# Patient Record
Sex: MALE | Race: WHITE | URBAN - METROPOLITAN AREA
[De-identification: names, ages, dates, MRNs, and addresses within clinical notes are randomized per-mention and may not be internally consistent; named-entity substitution may affect disease eponyms.]

---

## 2018-02-08 ENCOUNTER — THERAPY VISIT (OUTPATIENT)
Dept: PHYSICAL THERAPY | Facility: CLINIC | Age: 63
End: 2018-02-08
Payer: COMMERCIAL

## 2018-02-08 DIAGNOSIS — M54.41 ACUTE RIGHT-SIDED LOW BACK PAIN WITH RIGHT-SIDED SCIATICA: Primary | ICD-10-CM

## 2018-02-08 PROCEDURE — 97112 NEUROMUSCULAR REEDUCATION: CPT | Mod: GP | Performed by: PHYSICAL THERAPIST

## 2018-02-08 PROCEDURE — 97110 THERAPEUTIC EXERCISES: CPT | Mod: GP | Performed by: PHYSICAL THERAPIST

## 2018-02-08 PROCEDURE — 97161 PT EVAL LOW COMPLEX 20 MIN: CPT | Mod: GP | Performed by: PHYSICAL THERAPIST

## 2018-02-08 NOTE — LETTER
[facility] ATHLETIC MEDICINE  [facility]  [address]   [place] [address]  [phone]    2018    Re: Jhonatan Scales   :   1955  MRN:  [id number]   REFERRING PHYSICIAN:   Rocio Cervantes    [facility] ATHLETIC [facility] [facility]    Date of Initial Evaluation:  2018  Visits:  Rxs Used: 1 - pt arrived late 10'  Reason for Referral:  Acute right-sided low back pain with right-sided sciatica    EVALUATION SUMMARY    [facility] Initial Evaluation -- Lumbar    Date: 2018  Jhonatan Scales is a 62 year old male with a Lumbar condition.   Referral: GP  Work mechanical stresses:  Drafting/Design   Employment status:  normal  Leisure mechanical stresses: not a regular exerciser  Functional disability score (TESSIE/STarT Back):  See flowsheet  VAS score (0-10): 2/10, ranges 0-8/10  Patient goals/expectations:  Get rid of the pain    HISTORY:  Present symptoms: Rt LB and hip into post lat thigh  Pain quality (sharp/shooting/stabbing/aching/burning/cramping):  sharp   Paresthesia (yes/no):  Rt leg is numb  Present since (onset date): 2018 -woke sore after shoveling snow then moved furniture for his mother on Saturday 2-3-2018 and woke in pain  morning.  Painful since .     Symptoms (improving/unchanging/worsening):  Not changing.   Symptoms commenced as a result of: see above   Condition occurred in the following environment:   home   Symptoms at onset (back/thigh/leg): Rt hip pain > LB  Constant symptoms (back/thigh/leg): none  Intermittent symptoms (back/thigh/leg): Rt LB, hip and post lat thigh  Symptoms are made worse with the following: Always Bending, Always Rising, Time of day - Always AM and Other - dressing   Symptoms are made better with the following: Always Walking  Disturbed sleep (yes/no):  no Sleeping postures (prone/sup/side R/L): Side Lt> Rt currently  Previous episodes (0/1-5/6-10/11+): 10+ Year of  first episode: 10-15 yrs ago  Previous history: 1-2 x/yr back pain then resolves on its own  Previous treatments: PT for Lt thigh tingling      Re: Jhonatan Scales   :   1955      Specific Questions:  Cough/Sneeze/Strain (pos/neg): neg  Bowel/Bladder (normal/abnormal): normal  Gait (normal/abnormal): normal  Medications (nil/NSAIDS/analg/steroids/anticoag/other):  NSAIDS, Narcotics/Opiods and Steroids  Medical allergies:  Nka  General health (excellent/good/fair/poor):  good  Pertinent medical history:  None  Imaging (None/Xray/MRI/Other):  none  Recent or major surgery (yes/no):  none  Night pain (yes/no): no  Accidents (yes/no): no  Unexplained weight loss (yes/no): none  Barriers at home: none  Other red flags: none    EXAMINATION    Posture:   Sitting (good/fair/poor): fair - sitting upright but Lordosis is flattened  Standing (good/fair/poor):good  Lordosis (red/acc/normal): normal  Correction of posture (better/worse/no effect): worse - incr hip and LBP, but does have less pain w/ rising from sitting after using L-roll  Lateral Shift (right/left/nil): nil  Relevant (yes/no):    Other Observations: is kyphotic initially w/ stdg until he can get straightened up  Neurological:  Motor deficit:  decr Ant Tib Rt  Reflexes:  na  Sensory deficit:  na  Dural signs:  (+) Slump Rt    Movement Loss:   Waldo Mod Min Nil Pain   Flexion    X ERP Rt hip   Extension   X     Side Gliding R   X  ERP Rt HIp   Side Gliding L   X       Test Movements:   During: produces, abolishes, increases, decreases, no effect, centralizing, peripheralizing   After: better, worse, no better, no worse, no effect, centralized, peripheralized            Re: Jhonatan Scales   :   1955    Pretest symptoms standing: Rt Hip/LB   Symptoms During Symptoms After ROM increased ROM decreased No Effect   FIS Produces    No Worse         Rep FIS Produces    Worse     X    EIS Decreases    No Better         Rep EIS Decreases    No Better      X    Pretest symptoms lying: no pain pronelying    Symptoms During Symptoms After ROM increased ROM decreased No Effect   MICHELLE        Rep MICHELLE        EIL      W/ exhale No Effect   Produce Rt LB No Effect   NW      Rep EIL             W/ exhale Produces  Slight Rt LB   Produce Rt LB No Worse     Better     X - Less pain w/ ROM  X     Static Tests:  Sitting slouched:  No pain  Sitting erect:  Rt LB and hip pain  Standing slouched na  Standing erect:  na  Lying prone in extension:  na Long sitting:  na    Other Tests:     Provisional Classification:  Derangement - Asymmetrical, unilateral, symptoms below knee    Principle of Management:  Education:  Posture:  Reviewed neutral spine, using L-support (L-roll too much currently), affect of posture on spine and pain producing structures, rising after sitting w/ lordosis decreases pain, avoid couch and recliner, no propping up in bed.     Equipment provided:  none  Mechanical therapy (Y/N):  y   Extension principle:  Press up w/ exhale 10x every 2-3 hours and EIStdg (hands on hips at work)  Lateral Principle:    Flexion principle:    Other:      ASSESSMENT/PLAN:  Patient is a 62 year old male with lumbar complaints.    Patient has the following significant findings with corresponding treatment plan.                Diagnosis 1:  Rt LBP w/ Rt sciatica  Pain -  manual therapy, education, directional preference exercise and home program  Decreased ROM/flexibility - manual therapy, therapeutic exercise and home program  Re: Jhonatan Scales   :   1955      Decreased strength - therapeutic exercise, therapeutic activities and home program  Decreased function - therapeutic activities and home program  Impaired posture - neuro re-education and home program    Therapy Evaluation Codes:   1) History comprised of:   Personal factors that impact the plan of care:      None.    Comorbidity factors that impact the plan of care are:      None.     Medications impacting care:  Anti-inflammatory, Pain and Steroids.  2) Examination of Body Systems comprised of:   Body structures and functions that impact the plan of care:      Lumbar spine.   Activity limitations that impact the plan of care are:      Bending, Dressing and rising from sitting.  3) Clinical presentation characteristics are:   Stable/Uncomplicated.  4) Decision-Making    Low complexity using standardized patient assessment instrument and/or measureable assessment of functional outcome.  Cumulative Therapy Evaluation is: Low complexity.  Previous and current functional limitations:  (See Goal Flow Sheet for this information)    Short term and Long term goals: (See Goal Flow Sheet for this information)   Communication ability:  Patient appears to be able to clearly communicate and understand verbal and written communication and follow directions correctly.  Treatment Explanation - The following has been discussed with the patient:   RX ordered/plan of care  Anticipated outcomes  Possible risks and side effects  This patient would benefit from PT intervention to resume normal activities.   Rehab potential is good.  Frequency:  1 X week, once daily  Duration:  for 6 weeks  Discharge Plan:  Achieve all LTG.  Independent in home treatment program.  Reach maximal therapeutic benefit.    Thank you for your referral.      INQUIRIES  Therapist: Mamie Pierce, PT  [facility] [place] [facility]  [address] [facility] [address]  [place] [address]  Phone: [phone]  Fax: [phone]

## 2018-02-08 NOTE — PROGRESS NOTES
[facility] Initial Evaluation -- Lumbar    Date: February 8, 2018  Jhonatan Scales is a 62 year old male with a Lumbar condition.   Referral: GP  Work mechanical stresses:  Drafting/Design   Employment status:  normal  Leisure mechanical stresses: not a regular exerciser  Functional disability score (TESSIE/STarT Back):  See flowsheet  VAS score (0-10): 2/10, ranges 0-8/10  Patient goals/expectations:  Get rid of the pain    HISTORY:    Present symptoms: Rt LB and hip into post lat thigh  Pain quality (sharp/shooting/stabbing/aching/burning/cramping):  sharp   Paresthesia (yes/no):  Rt leg is numb    Present since (onset date): 1- -woke sore after shoveling snow then moved furniture for his mother on Saturday 2-3-2018 and woke in pain Jacques morning.  Painful since .     Symptoms (improving/unchanging/worsening):  Not changing.     Symptoms commenced as a result of: see above   Condition occurred in the following environment:   home     Symptoms at onset (back/thigh/leg): Rt hip pain > LB  Constant symptoms (back/thigh/leg): none  Intermittent symptoms (back/thigh/leg): Rt LB, hip and post lat thigh    Symptoms are made worse with the following: Always Bending, Always Rising, Time of day - Always AM and Other - dressing   Symptoms are made better with the following: Always Walking    Disturbed sleep (yes/no):  no Sleeping postures (prone/sup/side R/L): Side Lt> Rt currently    Previous episodes (0/1-5/6-10/11+): 10+ Year of first episode: 10-15 yrs ago    Previous history: 1-2 x/yr back pain then resolves on its own  Previous treatments: PT for Lt thigh tingling      Specific Questions:  Cough/Sneeze/Strain (pos/neg): neg  Bowel/Bladder (normal/abnormal): normal  Gait (normal/abnormal): normal  Medications (nil/NSAIDS/analg/steroids/anticoag/other):  NSAIDS, Narcotics/Opiods and Steroids  Medical allergies:  Nka  General health (excellent/good/fair/poor):  good  Pertinent medical history:   None  Imaging (None/Xray/MRI/Other):  none  Recent or major surgery (yes/no):  none  Night pain (yes/no): no  Accidents (yes/no): no  Unexplained weight loss (yes/no): none  Barriers at home: none  Other red flags: none    EXAMINATION    Posture:   Sitting (good/fair/poor): fair - sitting upright but Lordosis is flattened  Standing (good/fair/poor):good  Lordosis (red/acc/normal): normal  Correction of posture (better/worse/no effect): worse - incr hip and LBP, but does have less pain w/ rising from sitting after using L-roll    Lateral Shift (right/left/nil): nil  Relevant (yes/no):    Other Observations: is kyphotic initially w/ stdg until he can get straightened up    Neurological:    Motor deficit:  decr Ant Tib Rt  Reflexes:  na  Sensory deficit:  na  Dural signs:  (+) Slump Rt    Movement Loss:   Waldo Mod Min Nil Pain   Flexion    X ERP Rt hip   Extension   X     Side Gliding R   X  ERP Rt HIp   Side Gliding L   X       Test Movements:   During: produces, abolishes, increases, decreases, no effect, centralizing, peripheralizing   After: better, worse, no better, no worse, no effect, centralized, peripheralized    Pretest symptoms standing: Rt Hip/LB   Symptoms During Symptoms After ROM increased ROM decreased No Effect   FIS Produces    No Worse         Rep FIS Produces    Worse     X    EIS Decreases    No Better         Rep EIS Decreases    No Better      X   Pretest symptoms lying: no pain pronelying    Symptoms During Symptoms After ROM increased ROM decreased No Effect   MICHELLE        Rep MICHELLE        EIL      W/ exhale No Effect   Produce Rt LB No Effect   NW      Rep EIL             W/ exhale Produces  Slight Rt LB   Produce Rt LB No Worse     Better     X - Less pain w/ ROM  X     Static Tests:  Sitting slouched:  No pain  Sitting erect:  Rt LB and hip pain  Standing slouched na  Standing erect:  na  Lying prone in extension:  na Long sitting:  na    Other Tests:     Provisional Classification:  Derangement  - Asymmetrical, unilateral, symptoms below knee    Principle of Management:  Education:  Posture:  Reviewed neutral spine, using L-support (L-roll too much currently), affect of posture on spine and pain producing structures, rising after sitting w/ lordosis decreases pain, avoid couch and recliner, no propping up in bed.     Equipment provided:  none  Mechanical therapy (Y/N):  y   Extension principle:  Press up w/ exhale 10x every 2-3 hours and EIStdg (hands on hips at work)  Lateral Principle:    Flexion principle:    Other:      ASSESSMENT/PLAN:    Patient is a 62 year old male with lumbar complaints.    Patient has the following significant findings with corresponding treatment plan.                Diagnosis 1:  Rt LBP w/ Rt sciatica  Pain -  manual therapy, education, directional preference exercise and home program  Decreased ROM/flexibility - manual therapy, therapeutic exercise and home program  Decreased strength - therapeutic exercise, therapeutic activities and home program  Decreased function - therapeutic activities and home program  Impaired posture - neuro re-education and home program    Therapy Evaluation Codes:   1) History comprised of:   Personal factors that impact the plan of care:      None.    Comorbidity factors that impact the plan of care are:      None.     Medications impacting care: Anti-inflammatory, Pain and Steroids.  2) Examination of Body Systems comprised of:   Body structures and functions that impact the plan of care:      Lumbar spine.   Activity limitations that impact the plan of care are:      Bending, Dressing and rising from sitting.  3) Clinical presentation characteristics are:   Stable/Uncomplicated.  4) Decision-Making    Low complexity using standardized patient assessment instrument and/or measureable assessment of functional outcome.  Cumulative Therapy Evaluation is: Low complexity.    Previous and current functional limitations:  (See Goal Flow Sheet for this  information)    Short term and Long term goals: (See Goal Flow Sheet for this information)     Communication ability:  Patient appears to be able to clearly communicate and understand verbal and written communication and follow directions correctly.  Treatment Explanation - The following has been discussed with the patient:   RX ordered/plan of care  Anticipated outcomes  Possible risks and side effects  This patient would benefit from PT intervention to resume normal activities.   Rehab potential is good.    Frequency:  1 X week, once daily  Duration:  for 6 weeks  Discharge Plan:  Achieve all LTG.  Independent in home treatment program.  Reach maximal therapeutic benefit.    Please refer to the daily flowsheet for treatment today, total treatment time and time spent performing 1:1 timed codes.

## 2018-02-15 ENCOUNTER — THERAPY VISIT (OUTPATIENT)
Dept: PHYSICAL THERAPY | Facility: CLINIC | Age: 63
End: 2018-02-15
Payer: COMMERCIAL

## 2018-02-15 DIAGNOSIS — M54.41 ACUTE RIGHT-SIDED LOW BACK PAIN WITH RIGHT-SIDED SCIATICA: ICD-10-CM

## 2018-02-15 PROCEDURE — 97530 THERAPEUTIC ACTIVITIES: CPT | Mod: GP | Performed by: PHYSICAL THERAPIST

## 2018-02-15 PROCEDURE — 97110 THERAPEUTIC EXERCISES: CPT | Mod: GP | Performed by: PHYSICAL THERAPIST

## 2018-02-21 ENCOUNTER — THERAPY VISIT (OUTPATIENT)
Dept: PHYSICAL THERAPY | Facility: CLINIC | Age: 63
End: 2018-02-21
Payer: COMMERCIAL

## 2018-02-21 DIAGNOSIS — M54.41 ACUTE RIGHT-SIDED LOW BACK PAIN WITH RIGHT-SIDED SCIATICA: ICD-10-CM

## 2018-02-21 PROCEDURE — 97110 THERAPEUTIC EXERCISES: CPT | Mod: GP | Performed by: PHYSICAL THERAPIST

## 2018-02-21 PROCEDURE — 97530 THERAPEUTIC ACTIVITIES: CPT | Mod: GP | Performed by: PHYSICAL THERAPIST

## 2018-02-28 ENCOUNTER — THERAPY VISIT (OUTPATIENT)
Dept: PHYSICAL THERAPY | Facility: CLINIC | Age: 63
End: 2018-02-28
Payer: COMMERCIAL

## 2018-02-28 DIAGNOSIS — M54.41 ACUTE RIGHT-SIDED LOW BACK PAIN WITH RIGHT-SIDED SCIATICA: ICD-10-CM

## 2018-02-28 PROCEDURE — 97530 THERAPEUTIC ACTIVITIES: CPT | Mod: GP | Performed by: PHYSICAL THERAPIST

## 2018-02-28 PROCEDURE — 97110 THERAPEUTIC EXERCISES: CPT | Mod: GP | Performed by: PHYSICAL THERAPIST

## 2018-03-07 ENCOUNTER — THERAPY VISIT (OUTPATIENT)
Dept: PHYSICAL THERAPY | Facility: CLINIC | Age: 63
End: 2018-03-07
Payer: COMMERCIAL

## 2018-03-07 DIAGNOSIS — M54.41 ACUTE RIGHT-SIDED LOW BACK PAIN WITH RIGHT-SIDED SCIATICA: ICD-10-CM

## 2018-03-07 PROCEDURE — 97110 THERAPEUTIC EXERCISES: CPT | Mod: GP | Performed by: PHYSICAL THERAPIST

## 2018-03-22 ENCOUNTER — THERAPY VISIT (OUTPATIENT)
Dept: PHYSICAL THERAPY | Facility: CLINIC | Age: 63
End: 2018-03-22
Payer: COMMERCIAL

## 2018-03-22 DIAGNOSIS — M54.41 ACUTE RIGHT-SIDED LOW BACK PAIN WITH RIGHT-SIDED SCIATICA: ICD-10-CM

## 2018-03-22 PROCEDURE — 97110 THERAPEUTIC EXERCISES: CPT | Mod: GP | Performed by: PHYSICAL THERAPIST

## 2018-03-22 PROCEDURE — 97530 THERAPEUTIC ACTIVITIES: CPT | Mod: GP | Performed by: PHYSICAL THERAPIST
